# Patient Record
Sex: MALE | Race: AMERICAN INDIAN OR ALASKA NATIVE | HISPANIC OR LATINO | Employment: FULL TIME | URBAN - METROPOLITAN AREA
[De-identification: names, ages, dates, MRNs, and addresses within clinical notes are randomized per-mention and may not be internally consistent; named-entity substitution may affect disease eponyms.]

---

## 2024-03-16 ENCOUNTER — APPOINTMENT (OUTPATIENT)
Dept: RADIOLOGY | Facility: CLINIC | Age: 24
End: 2024-03-16
Attending: EMERGENCY MEDICINE
Payer: COMMERCIAL

## 2024-03-16 ENCOUNTER — HOSPITAL ENCOUNTER (EMERGENCY)
Facility: CLINIC | Age: 24
Discharge: HOME | End: 2024-03-16
Admitting: PHYSICIAN ASSISTANT
Payer: COMMERCIAL

## 2024-03-16 VITALS
RESPIRATION RATE: 16 BRPM | OXYGEN SATURATION: 97 % | DIASTOLIC BLOOD PRESSURE: 88 MMHG | HEIGHT: 72 IN | SYSTOLIC BLOOD PRESSURE: 149 MMHG | WEIGHT: 300 LBS | BODY MASS INDEX: 40.63 KG/M2 | TEMPERATURE: 97.6 F | HEART RATE: 93 BPM

## 2024-03-16 DIAGNOSIS — S62.366A CLOSED NONDISPLACED FRACTURE OF NECK OF FIFTH METACARPAL BONE OF RIGHT HAND, INITIAL ENCOUNTER: ICD-10-CM

## 2024-03-16 PROCEDURE — 99284 EMERGENCY DEPT VISIT MOD MDM: CPT | Mod: 25

## 2024-03-16 PROCEDURE — 73130 X-RAY EXAM OF HAND: CPT | Mod: RT

## 2024-03-16 PROCEDURE — 29125 APPL SHORT ARM SPLINT STATIC: CPT | Mod: RT

## 2024-03-16 ASSESSMENT — COLUMBIA-SUICIDE SEVERITY RATING SCALE - C-SSRS
1. IN THE PAST MONTH, HAVE YOU WISHED YOU WERE DEAD OR WISHED YOU COULD GO TO SLEEP AND NOT WAKE UP?: NO
2. HAVE YOU ACTUALLY HAD ANY THOUGHTS OF KILLING YOURSELF IN THE PAST MONTH?: NO
6. HAVE YOU EVER DONE ANYTHING, STARTED TO DO ANYTHING, OR PREPARED TO DO ANYTHING TO END YOUR LIFE?: NO

## 2024-03-16 NOTE — DISCHARGE INSTRUCTIONS
As we discussed, you broke your hand (fractured your fifth metacarpal).  Please wear the splint and call [facility] on Monday to schedule follow-up appointment with a hand specialist in clinic next week.  In the meantime, please continue to elevate your arm, ice for 20 minutes every 2-3 hours, and use Tylenol and ibuprofen for pain.  If it anytime the splint feels too tight meaning you have increased pain, bluish discoloration of your fingers, paleness of your fingers, coldness, or numbness of your hand please loosen the Ace wrap.  If this does not completely resolve your symptoms, you must take the entire splint off and return to the ER for further evaluation.    Your blood pressure was elevated in the emergencydepartment today and requires recheck and close follow-up in your primary care clinic. Untreated blood pressure can cause serious complications including, but not limited to stroke, heart attack/failure, and kidney disease.  Please make a close follow-up appointment to have this recheck performed. Please return to the emergency department immediately if you develop a severe headache, vision changes, chest pain, shortness of breath, orabdominal pain.

## 2024-03-16 NOTE — ED PROVIDER NOTES
Emergency Department Encounter   NAME: Thomas Munoz ; AGE: 23 year old male ; YOB: 2000 ; MRN: [id number] ; PCP: System, Provider Not In   ED PROVIDER: Ashlie Coleman PA-C    Evaluation Date & Time:   No admission date for patient encounter.    CHIEF COMPLAINT:  Hand Injury      Impression and Plan   MDM:   Thomas Munoz is a 23 year old male with no pertinent medical history, who presents to the ED by walk-in for evaluation of hand pain.  The patient presents to the emergency department for evaluation of pain in his dominant right hand after punching a boxing bag with a closed non-glove fist yesterday evening. On exam, he has moderate swelling of the dorsum hand with tenderness over his fifth proximal metacarpal, decreased range of motion at his fifth MCP.  No involvement of the wrist.  Distal fingers CMS intact.  X-ray of the hand obtained and confirmed suspicion of a boxer's fracture involving the fifth metacarpal neck with some mild volar angulation though there is no definitive intra-articular extension.  Patient was placed in a ulnar gutter splint with improvement of his pain and distal CMS unchanged following splint placement.  We discussed icing, elevation, Tylenol and ibuprofen for pain relief at home.  We discussed splint care, activities to avoid, monitoring for compartment syndrome, and reasons to return to the ED. plan for patient to call [facility] Monday morning for follow-up in clinic later that week with hand specialist to determine ongoing treatment.  Patient is comfortable with this plan was discharged home in stable condition.  Advised blood pressure recheck in clinic.    Medical Decision Making    History:  Supplemental history from: girlfriend   External Record(s) reviewed: Documented in chart    Work Up:  Chart documentation includes differential considered and any EKGs or imaging independently interpreted by provider, where specified.  In additional to work  up documented, I considered the following work up: Documented in chart, if applicable.    External consultation:  Discussion of management with another provider: Documented in chart, if applicable    Complicating factors:  Care impacted by chronic illness: N/A  Care affected by social determinants of health: Access to Affordable Health Care    Disposition considerations: Discharge. No recommendations on prescription strength medication(s). See documentation for any additional details.      ED COURSE:  5:12 PM I met and introduced myself to the patient. I gathered initial history and performed my physical exam. We discussed plan for initial workup. Informed patient right hand XR results. Discussed plan to splint right hand once room is available.  5:45 PM Performed splint in H-D.  5:59 PM I rechecked the patient and discussed results, discharge, follow up, and reasons to return to the ED.     At the conclusion of the encounter I discussed the results of all the tests and the disposition. The questions were answered. The patient or family acknowledged understanding and was agreeable with the care plan.    FINAL IMPRESSION:    ICD-10-CM    1. Closed nondisplaced fracture of neck of fifth metacarpal bone of right hand, initial encounter  S62.366A             MEDICATIONS GIVEN IN THE EMERGENCY DEPARTMENT:  Medications - No data to display      NEW PRESCRIPTIONS STARTED AT TODAY'S ED VISIT:  New Prescriptions    No medications on file         HPI   Patient information was obtained from: Patient   Use of Intrepreter: N/A    Thomas WADE Munoz is a 23 year old male with no pertinent medical history, who presents to the ED by walk-in for evaluation of hand pain.    Patient reports he was boxing and punching a punching bag without boxing gloves, noting he usually does wears boxing gloves, but didn't this time. Reports right hand pain localized around right 4th and 5th metacarpals, swelling, and notes he isn't tiffany to fully  flex his right 5th finger. Denies right wrist pain and is able to flex and extend wrist. No numbness to all right fingers. Sensations of all right fingers are intact. He is right-handed. No other reported complaints or concerns at this time.      REVIEW OF SYSTEMS:  Pertinent positive and negative symptoms per HPI.       Medical History     No past medical history on file.    No past surgical history on file.    No family history on file.         No current outpatient medications on file.        Physical Exam     First Vitals:  Patient Vitals for the past 24 hrs:   BP Temp Temp src Pulse Resp SpO2 Height Weight   03/16/24 1630 (!) 149/88 97.6  F (36.4  C) Temporal 93 16 97 % 1.829 m (6') 136.1 kg (300 lb)         PHYSICAL EXAM:   Physical Exam  Vitals and nursing note reviewed.   Constitutional:       General: He is not in acute distress.  Musculoskeletal:      Comments: Moderate soft tissue swelling of the dorsum of the right hand with tenderness over lateral hand 5th proximal metacarpal without palpable deformity.  Ability to fully flex and extend at his fifth MCP is limited.  He is able to flex and extend at DIP and PIP joints.  Fingers are warm and well-perfused with distal cap refill less than 2 seconds.  Sensation intact all digits and hand.  2+ radial pulses.  No tenderness to the wrist or snuffbox and full active range of motion of the wrist.   Neurological:      Mental Status: He is alert.             Results     LAB:  All pertinent labs reviewed and interpreted  Labs Ordered and Resulted from Time of ED Arrival to Time of ED Departure - No data to display    RADIOLOGY:  XR Hand Right G/E 3 Views   Final Result   IMPRESSION: Acute fracture across the fifth metacarpal neck with mild volar angulation. No definite intra-articular extension. Mild soft tissue swelling. There is normal joint spacing and alignment.            ECG:  N/A      PROCEDURES:  PROCEDURE: Splint Placement   INDICATIONS: right hand  fracture   PROCEDURE PROVIDER: Ashlie Coleman PA-C   NOTE:  A Ulnar gutter splint made of Plaster was applied to the Right upper extremity by the above provider. As noted in the physical exam, distal CMS was intact prior to placement. The splint was checked and the fit was adjusted to ensure proper positioning after placement. Sensation and circulation, as well as motor function, are unchanged after splint placement and the patient is more comfortable with the splint in place.         I, Jacqueline Us, am serving as a scribe to document services personally performed by Ashlie Coleman PA-C, based on my observation and the provider's statements to me. I, Ashlie Coleman PA-C attest that Jacqueline Us is acting in a scribe capacity, has observed my performance of the services and has documented them in accordance with my direction.       Ashlie Coleman PA-C   Emergency Medicine   [facility] EMERGENCY ROOM       Ashlie Coleman PA-C  03/16/24 1858     149.86

## 2024-03-16 NOTE — ED TRIAGE NOTES
Patient presents to ED with pain and swelling to R hand, patient reports that he was punching a punching bag yesterday when he began to experience pain, CMS intact.  Sarita Elizabeth RN.......3/16/2024 4:32 PM     Triage Assessment (Adult)       Row Name 03/16/24 1631          Triage Assessment    Airway WDL WDL        Respiratory WDL    Respiratory WDL WDL        Skin Circulation/Temperature WDL    Skin Circulation/Temperature WDL WDL        Cardiac WDL    Cardiac WDL WDL        Peripheral/Neurovascular WDL    Peripheral Neurovascular WDL WDL        Cognitive/Neuro/Behavioral WDL    Cognitive/Neuro/Behavioral WDL WDL

## 2024-04-28 ENCOUNTER — HOSPITAL ENCOUNTER (EMERGENCY)
Facility: HOSPITAL | Age: 24
Discharge: HOME | End: 2024-04-28
Attending: EMERGENCY MEDICINE | Admitting: EMERGENCY MEDICINE
Payer: COMMERCIAL

## 2024-04-28 VITALS
TEMPERATURE: 98.4 F | RESPIRATION RATE: 16 BRPM | BODY MASS INDEX: 40.88 KG/M2 | HEART RATE: 83 BPM | WEIGHT: 301.4 LBS | SYSTOLIC BLOOD PRESSURE: 140 MMHG | DIASTOLIC BLOOD PRESSURE: 84 MMHG | OXYGEN SATURATION: 98 %

## 2024-04-28 DIAGNOSIS — L97.521 SKIN ULCER OF TOE OF LEFT FOOT, LIMITED TO BREAKDOWN OF SKIN (H): ICD-10-CM

## 2024-04-28 PROCEDURE — 99283 EMERGENCY DEPT VISIT LOW MDM: CPT

## 2024-04-28 RX ORDER — SULFAMETHOXAZOLE/TRIMETHOPRIM 800-160 MG
1 TABLET ORAL 2 TIMES DAILY
Qty: 14 TABLET | Refills: 0 | Status: SHIPPED | OUTPATIENT
Start: 2024-04-28 | End: 2024-05-05

## 2024-04-28 ASSESSMENT — COLUMBIA-SUICIDE SEVERITY RATING SCALE - C-SSRS
6. HAVE YOU EVER DONE ANYTHING, STARTED TO DO ANYTHING, OR PREPARED TO DO ANYTHING TO END YOUR LIFE?: NO
2. HAVE YOU ACTUALLY HAD ANY THOUGHTS OF KILLING YOURSELF IN THE PAST MONTH?: NO
1. IN THE PAST MONTH, HAVE YOU WISHED YOU WERE DEAD OR WISHED YOU COULD GO TO SLEEP AND NOT WAKE UP?: NO

## 2024-04-29 NOTE — DISCHARGE INSTRUCTIONS
Keep the wound clean and dry.  I am covering you with antibiotics erring on the side of caution in case there is an early infection.  Follow-up with podiatry as I do think you will likely need some additional debridement.  A referral was placed for same and they should be calling you to schedule outpatient appointment.

## 2024-04-29 NOTE — ED TRIAGE NOTES
Pt states he had some kind of blister/pimple on left great toe.  Now open and bloody.  Pt states pain is 10/10.  No injury     Triage Assessment (Adult)       Row Name 04/28/24 8644          Triage Assessment    Airway WDL WDL        Respiratory WDL    Respiratory WDL WDL        Skin Circulation/Temperature WDL    Skin Circulation/Temperature WDL X        Cardiac WDL    Cardiac WDL WDL  left great toe wound        Peripheral/Neurovascular WDL    Peripheral Neurovascular WDL WDL        Cognitive/Neuro/Behavioral WDL    Cognitive/Neuro/Behavioral WDL WDL

## 2024-04-29 NOTE — ED PROVIDER NOTES
"EMERGENCY DEPARTMENT ENCOUNTER      NAME: Thomas Munoz  AGE: 23 year old male  YOB: 2000  MRN: [id number]  EVALUATION DATE & TIME: 4/28/2024 11:09 PM    PCP: System, Provider Not In    ED PROVIDER: Nayeli Bañuelos MD    Chief Complaint   Patient presents with    Toe Pain         FINAL IMPRESSION:  1. Skin ulcer of toe of left foot, limited to breakdown of skin (H)          ED COURSE & MEDICAL DECISION MAKING:    Pertinent Labs & Imaging studies reviewed. (See chart for details)  23 year old male with history of otherwise healthy who presents to the Emergency Department for evaluation of a spot on his left second toe that is open and draining now after it was a \"pimple\" over the last month.  On examination patient has a ulcerated area on the tip of the left second toe.  Unclear whether this is potentially wart versus granuloma.  It is really does not look infected on examination but seems how of the foot will err on the side of caution and cover with antibiotics and have him follow-up with podiatry.           Medical Decision Making    History:  Supplemental history from: Family at bedside  External Record(s) reviewed: Outpatient Record: Outside ED visit 3/16/2024    Work Up:  Chart documentation includes differential considered and any EKGs or imaging independently interpreted by provider, see MDM  In additional to work up documented, I considered the following work up: see MDM    External consultation:  Discussion of management with another provider: None    Complicating factors:  Care impacted by chronic illness: N/A  Care affected by social determinants of health: Access to Medical Care weekend night shift no access to PCP    Disposition considerations: Discharge. I prescribed additional prescription strength medication(s) as charted. See documentation for any additional details.        At the conclusion of the encounter I discussed the results of all of the tests and the disposition. The " "questions were answered. The patient or family acknowledged understanding and was agreeable with the care plan.      MEDICATIONS GIVEN IN THE EMERGENCY:  Medications - No data to display    NEW PRESCRIPTIONS STARTED AT TODAY'S ER VISIT  Discharge Medication List as of 4/28/2024 11:29 PM        START taking these medications    Details   sulfamethoxazole-trimethoprim (BACTRIM DS) 800-160 MG tablet Take 1 tablet by mouth 2 times daily for 7 days, Disp-14 tablet, R-0, Local Print                =================================================================    HPI    Patient information was obtained from: Patient    Use of Intrepreter: N/A     Thmoas WADE Munoz is a 23 year old male with no pertinent medical history who presents for evaluation of a toe problem.    The patient reports for the past month he has had a \"blister or pimple\" on the tip of his left second toe. The area is uncomfortable but not painful. Tonight it ruptured open and stated bleeding. He does not have any other lesions or sores. He is otherwise healthy and denies any history of diabetes.      PAST MEDICAL HISTORY:  History reviewed. No pertinent past medical history.    PAST SURGICAL HISTORY:  History reviewed. No pertinent surgical history.    CURRENT MEDICATIONS:    None       ALLERGIES:  No Known Allergies    FAMILY HISTORY:  History reviewed. No pertinent family history.    SOCIAL HISTORY:        VITALS:  Patient Vitals for the past 24 hrs:   BP Temp Temp src Pulse Resp SpO2 Weight   04/28/24 2305 (!) 140/84 98.4  F (36.9  C) Oral 83 16 98 % 136.7 kg (301 lb 6.4 oz)       PHYSICAL EXAM    General Appearance: Well-appearing, well-nourished, no acute distress  Cardio:  Regular rate and rhythm  Pulm:  No respiratory distress  Abdomen: Obese  Extremities:  Left second toe with ulcerated are with curled up edges lateral to the distal nail bed, no active bleeding but evidence of recent bleeding, no ingrown toenail.  Skin:  Skin warm, dry, no " rashes  Neuro:  Alert and oriented ×3       RADIOLOGY/LABS:  Reviewed all pertinent imaging. Please see official radiology report. All pertinent labs reviewed and interpreted.           The creation of this record is based on the scribe s observations of the work being performed by Nayeli Bañuelos MD and the provider s statements to them. It was created on her behalf by Ulises Navarro, a trained medical scribe. This document has been checked and approved by the attending provider.    Nayeli Bañuelos MD  Emergency Medicine  [facility] EMERGENCY DEPARTMENT  [address] [place] [id number]  [phone]  Dept: [phone]     Nayeli Bañuelos MD  04/29/24 0033

## 2025-06-12 ENCOUNTER — HOSPITAL ENCOUNTER (EMERGENCY)
Facility: HOSPITAL | Age: 25
Discharge: HOME | End: 2025-06-12
Attending: EMERGENCY MEDICINE
Payer: COMMERCIAL

## 2025-06-12 ENCOUNTER — ANCILLARY PROCEDURE (OUTPATIENT)
Dept: ULTRASOUND IMAGING | Facility: HOSPITAL | Age: 25
End: 2025-06-12
Attending: EMERGENCY MEDICINE
Payer: COMMERCIAL

## 2025-06-12 ENCOUNTER — APPOINTMENT (OUTPATIENT)
Dept: RADIOLOGY | Facility: HOSPITAL | Age: 25
End: 2025-06-12
Attending: EMERGENCY MEDICINE
Payer: COMMERCIAL

## 2025-06-12 VITALS
BODY MASS INDEX: 42.65 KG/M2 | HEART RATE: 72 BPM | SYSTOLIC BLOOD PRESSURE: 131 MMHG | HEIGHT: 70 IN | OXYGEN SATURATION: 97 % | WEIGHT: 297.9 LBS | TEMPERATURE: 99 F | RESPIRATION RATE: 17 BRPM | DIASTOLIC BLOOD PRESSURE: 78 MMHG

## 2025-06-12 DIAGNOSIS — M79.604 RIGHT LEG PAIN: ICD-10-CM

## 2025-06-12 DIAGNOSIS — L03.115 CELLULITIS OF RIGHT LOWER EXTREMITY: ICD-10-CM

## 2025-06-12 LAB
ANION GAP SERPL CALCULATED.3IONS-SCNC: 13 MMOL/L (ref 7–15)
BUN SERPL-MCNC: 13.6 MG/DL (ref 6–20)
CALCIUM SERPL-MCNC: 9.5 MG/DL (ref 8.8–10.4)
CHLORIDE SERPL-SCNC: 103 MMOL/L (ref 98–107)
CREAT SERPL-MCNC: 0.71 MG/DL (ref 0.67–1.17)
CRP SERPL-MCNC: 6.9 MG/L
EGFRCR SERPLBLD CKD-EPI 2021: >90 ML/MIN/1.73M2
ERYTHROCYTE [DISTWIDTH] IN BLOOD BY AUTOMATED COUNT: 13.2 % (ref 10–15)
GLUCOSE SERPL-MCNC: 118 MG/DL (ref 70–99)
HCO3 SERPL-SCNC: 24 MMOL/L (ref 22–29)
HCT VFR BLD AUTO: 43.9 % (ref 40–53)
HGB BLD-MCNC: 14 G/DL (ref 13.3–17.7)
MCH RBC QN AUTO: 27.3 PG (ref 26.5–33)
MCHC RBC AUTO-ENTMCNC: 31.9 G/DL (ref 31.5–36.5)
MCV RBC AUTO: 86 FL (ref 78–100)
PLATELET # BLD AUTO: 348 10E3/UL (ref 150–450)
POTASSIUM SERPL-SCNC: 3.7 MMOL/L (ref 3.4–5.3)
RBC # BLD AUTO: 5.13 10E6/UL (ref 4.4–5.9)
SODIUM SERPL-SCNC: 140 MMOL/L (ref 135–145)
WBC # BLD AUTO: 9.2 10E3/UL (ref 4–11)

## 2025-06-12 PROCEDURE — 86140 C-REACTIVE PROTEIN: CPT | Performed by: EMERGENCY MEDICINE

## 2025-06-12 PROCEDURE — 250N000013 HC RX MED GY IP 250 OP 250 PS 637: Performed by: EMERGENCY MEDICINE

## 2025-06-12 PROCEDURE — 85027 COMPLETE CBC AUTOMATED: CPT | Performed by: EMERGENCY MEDICINE

## 2025-06-12 PROCEDURE — 80048 BASIC METABOLIC PNL TOTAL CA: CPT | Performed by: EMERGENCY MEDICINE

## 2025-06-12 PROCEDURE — 36415 COLL VENOUS BLD VENIPUNCTURE: CPT | Performed by: EMERGENCY MEDICINE

## 2025-06-12 PROCEDURE — 73610 X-RAY EXAM OF ANKLE: CPT | Mod: RT

## 2025-06-12 PROCEDURE — 99284 EMERGENCY DEPT VISIT MOD MDM: CPT

## 2025-06-12 RX ORDER — CEPHALEXIN 500 MG/1
500 CAPSULE ORAL ONCE
Status: COMPLETED | OUTPATIENT
Start: 2025-06-12 | End: 2025-06-12

## 2025-06-12 RX ORDER — CEPHALEXIN 500 MG/1
500 CAPSULE ORAL 2 TIMES DAILY
Qty: 14 CAPSULE | Refills: 0 | Status: SHIPPED | OUTPATIENT
Start: 2025-06-12

## 2025-06-12 RX ORDER — CEPHALEXIN 500 MG/1
500 CAPSULE ORAL 2 TIMES DAILY
Qty: 14 CAPSULE | Refills: 0 | Status: SHIPPED | OUTPATIENT
Start: 2025-06-12 | End: 2025-06-12

## 2025-06-12 RX ADMIN — CEPHALEXIN 500 MG: 500 CAPSULE ORAL at 22:58

## 2025-06-12 ASSESSMENT — ACTIVITIES OF DAILY LIVING (ADL)
ADLS_ACUITY_SCORE: 41
ADLS_ACUITY_SCORE: 41

## 2025-06-12 NOTE — Clinical Note
Thomas Munoz was seen and treated in our emergency department on 6/12/2025.  He may return to work on 06/16/2025.       If you have any questions or concerns, please don't hesitate to call.      Caitlin Ruelas MD

## 2025-06-13 NOTE — DISCHARGE INSTRUCTIONS
You were seen in the Emergency Department today for leg pain. As we discussed, it looks like you have a skin infection called cellulitis.     You were given your first dose of antibiotics here in the Emergency Department and are being sent with a prescription for this medication.You should continue to take the entire course as prescribed, even if you feel like your symptoms are getting better.       Please return to the ER if you experience fever, increasing redness, swelling, or pain, inability to keep food/fluids down, and/or for any other new or concerning symptoms, otherwise please follow up with your primary doctor in 2-3 days for recheck.     Below is some information you might find useful.     Thank you for choosing [facility]. It was a pleasure taking care of you today!  - Dr. Caitlin Ruelas

## 2025-06-13 NOTE — ED TRIAGE NOTES
Patient arrives to triage from home with family with chief complaint of right leg pain.  Patient reports he had surgery on leg about a month and half ago, just recently started to have increase pain and noticed increased warmth as well.  Minimal swelling noted per patient.  No new injury reported.  Alert and oriented x4.

## 2025-06-13 NOTE — ED PROVIDER NOTES
"EMERGENCY DEPARTMENT ENCOUNTER      NAME: Thomas Munoz  AGE: 25 year old male  YOB: 2000  EVALUATION DATE & TIME: No admission date for patient encounter.    ED PROVIDER: Caitlin Ruelas MD    Chief Complaint   Patient presents with    Leg Pain       FINAL IMPRESSION  No diagnosis found.    MEDICAL DECISION MAKING   Thomas Munoz is a 25 year old male who presents via *** with *** for evaluation of ***.            At the end of the encounter, we reviewed the results, potential diagnoses, as well as return precautions and recommendations for follow up. I instructed Mr. Adrianna Munoz to return to the emergency department immediately if he develops any new or worsening symptoms and provided additional verbal discharge instructions. Mr. Adrianna Munoz expressed understanding and agreement with this plan of care, his questions were answered, and he was discharged in stable condition.      Additional Considerations in MDM  History:  Supplemental history from: ***  External Record(s) reviewed: Post-operative visit on 05/22/2025 at Orthopedics at [address].    Work Up:  Chart documentation includes differential diagnoses considered and any EKGs or imaging independently interpreted as specified above.   In additional to work up documented, I considered additional advanced imaging and laboratory workup but deferred after shared decision making conversation with patient/family    External Consultation(s):  Discussion of management with another provider as documented above and in ED course     Chronic Illness(es):  Care impacted by chronic illness(es): N/A    Disposition considerations: {ADMIT VS D/C:226772}    MIPS: {ECC MIPS DOCUMENTATION:774705}       Sepsis/STEMI/Stroke: {Sepsis/Stemi/Stroke:907654::\"None\"}        ED COURSE  9:20 PM I met with the patient, obtained history, performed an initial exam, and discussed options and plan for diagnostics and treatment here in the " ED.      MEDICATIONS GIVEN IN THE ED  Medications - No data to display    NEW PRESCRIPTIONS STARTED AT TODAY'S VISIT  New Prescriptions    No medications on file          =================================================================    HPI:    Use of : N/A     Thomas Munoz is a 25 year old male who presents for evaluation of leg pain.    Patient presents with pain and redness to his right leg that has been present for the past week. He reports that he had surgery done on this leg about 1 month ago. He notes that the pain and warmth to the leg has been increasing, however, the redness of his leg has not spread since he first noticed it. Patient has not taken any medications for symptom management. The warmth of the leg is what is most concerning to him at this time.    Patient denies fever, recent illness, or any other complaints at this time.      Chart Review:  05/22/2025: Patient was seen at Orthopedics at [address] for a post-operative visit per his IMN R tibia procedure done on 04/11/2025. No complications noted during the procedure. At this visit, patient reported he was continuing to do well and denied pain or need for further pain medication. He was able to fully bear weight on his leg without difficulty and minimal support from his cane. Patient denied need for physical therapy at this time. Examination of right lower extremity showed a clean, dry, intact surgical incision with no erythema or drainage noted. Patient to follow up in 6 weeks with Dr. Ornelas for repeat imaging, or sooner if patient noticed any concerns.     RELEVANT HISTORY, MEDICATIONS, & ALLERGIES   Past medical history, surgical history, family history, medications, and allergies reviewed and pertinent noted in HPI.    REVIEW OF SYSTEMS:  A complete review of systems was performed with pertinent positives and negatives noted in the HPI.     PHYSICAL EXAM:    Vitals: There were no  "vitals taken for this visit.   General: Alert and interactive, comfortable appearing.  HENT: Atraumatic. Full AROM of neck. MMM.  Cardiovascular: Regular rate and rhythm.   Chest/Pulmonary: Normal work of breathing. Speaking in complete sentences. Lungs CTAB. No chest wall tenderness or deformities.  Abdomen: Soft, nondistended. Nontender without guarding or rebound.  Extremities: Normal AROM of all major joints.  Skin: Warm and dry. Normal skin color.   Neuro: Speech clear. CNs grossly intact. Moves all extremities spontaneously.   Psych: Normal affect/mood, cooperative, memory appropriate.          LAB  Labs Ordered and Resulted from Time of ED Arrival to Time of ED Departure - No data to display    RADIOLOGY  No orders to display       EKG  Performed at: ***  Impression: ***  Rate: ***  Rhythm: ***  QRS Interval: ***  QTc Interval: ***  Comparison: ***    All laboratory and imaging results and EKG's were personally reviewed and interpreted by myself prior to disposition decision.       PROCEDURES  PROCEDURE: Emergency Department Limited Bedside Screening Ultrasound   TYPE:    ED LIMITED BEDSIDE SCREENING US - SOFT TISSUE   INDICATIONS: ***   PROCEDURE PROVIDER:   Caitlin Ruelas    WINDOW AND FINDINGS: ***   IMAGES SAVED AND STORED FOR ARCHIVE AND REVIEW: {Yes:553417::\"Yes\"}          I, Primo Miranda, am serving as a scribe to document services personally performed by Dr. Caitlin Ruelas based on my observation and the provider's statements to me. I, Caitlni Ruelas MD attest that Primo Miranda is acting in a scribe capacity, has observed my performance of the services and has documented them in accordance with my direction.    Caitlin Ruelas M.D.  Emergency Medicine  [facility] EMERGENCY DEPARTMENT  [address] [place] [id number]  [phone]  Dept: [phone]  " "this visit, patient reported he was continuing to do well and denied pain or need for further pain medication. He was able to fully bear weight on his leg without difficulty and minimal support from his cane. Patient denied need for physical therapy at this time. Examination of right lower extremity showed a clean, dry, intact surgical incision with no erythema or drainage noted. Patient to follow up in 6 weeks with Dr. Ornelas for repeat imaging, or sooner if patient noticed any concerns.     RELEVANT HISTORY, MEDICATIONS, & ALLERGIES   Past medical history, surgical history, family history, medications, and allergies reviewed and pertinent noted in HPI.    REVIEW OF SYSTEMS:  A complete review of systems was performed with pertinent positives and negatives noted in the HPI.     PHYSICAL EXAM:    Vitals: /78   Pulse 72   Temp 99  F (37.2  C) (Oral)   Resp 17   Ht 1.778 m (5' 10\")   Wt 135.1 kg (297 lb 14.4 oz)   SpO2 97%   BMI 42.74 kg/m    General: Awake, alert, interactive.   HENT: Atraumatic. MMM.   Neck: Full AROM.  Cardiovascular: Regular rate.  Respiratory/Chest: Normal work of breathing.   Abdomen: Non-distended.   Right leg: Faint erythema and warmth to anterior aspect of shin.  No crepitus, fluctuance, open wounds/lesions.  Old surgical scars noted.  Normal range of motion of all joints of right lower extremity.  Distal pulses intact.  Sensation intact all distributions.  See photos below.  Skin: Normal color. No visible rash.  Neurologic: Alert, oriented. Speech clear. CNs grossly intact. Moving all extremities spontaneously.   Psychiatric: Normal affect/mood.           LAB  Labs Ordered and Resulted from Time of ED Arrival to Time of ED Departure   CRP INFLAMMATION - Abnormal       Result Value    CRP Inflammation 6.90 (*)    BASIC METABOLIC PANEL - Abnormal    Sodium 140      Potassium 3.7      Chloride 103      Carbon Dioxide (CO2) 24      Anion Gap 13      Urea Nitrogen 13.6      " Creatinine 0.71      GFR Estimate >90      Calcium 9.5      Glucose 118 (*)    CBC WITH PLATELETS - Normal    WBC Count 9.2      RBC Count 5.13      Hemoglobin 14.0      Hematocrit 43.9      MCV 86      MCH 27.3      MCHC 31.9      RDW 13.2      Platelet Count 348         RADIOLOGY  Ankle XR, G/E 3 views, right   Final Result   IMPRESSION: Right tibial intramedullary nail with proximal and distal interlocking screws. Increased callus formation at the distal fibular and tibial shaft fractures, though fracture lucencies remain visible. Hardware is intact. No new fracture    visualized.      POC US SOFT TISSUE    (Results Pending)         PROCEDURES  PROCEDURE: Emergency Department Limited Bedside Screening Ultrasound   TYPE:    ED LIMITED BEDSIDE SCREENING US - SOFT TISSUE   INDICATIONS: Redness, warmth   PROCEDURE PROVIDER:   Caitlin Ruelas    WINDOW AND FINDINGS: Subtle cobblestoning to soft tissue. No large hypoechoic areas.   IMAGES SAVED AND STORED FOR ARCHIVE AND REVIEW: Yes          I, Primo Miranda, am serving as a scribe to document services personally performed by Dr. Caitlin Ruelas based on my observation and the provider's statements to me. I, Caitlin Ruelas MD attest that Primo Miranda is acting in a scribe capacity, has observed my performance of the services and has documented them in accordance with my direction.    Caitlin Ruelas M.D.  Emergency Medicine  [facility] EMERGENCY DEPARTMENT  [facility] [facility] [id number]  [phone]  Dept: [phone]     Caitlin Ruelas MD  06/13/25 [id number]